# Patient Record
Sex: FEMALE
[De-identification: names, ages, dates, MRNs, and addresses within clinical notes are randomized per-mention and may not be internally consistent; named-entity substitution may affect disease eponyms.]

---

## 2022-11-12 ENCOUNTER — NURSE TRIAGE (OUTPATIENT)
Dept: OTHER | Facility: CLINIC | Age: 87
End: 2022-11-12

## 2022-11-12 NOTE — TELEPHONE ENCOUNTER
Location of patient: [place]    Subjective: Caller states \"fall\"     Current Symptoms: fall and c/o headache, jaw hurting and ribs has a lump back of head in middle quarter size, right side fall occurred 4 hours ago now c/o pain , no nausea no vision changes has glaucoma not confused is having some dizziness does not take blood thinners     Spoke with on call Dr Michelle Green regarding the patient and agrees with dispo to be seen in 4 hours     Associated Symptoms: NA    Pain Severity: 5/10    Temperature: none       What has been tried: ice     Recommended disposition: to be seen in 4 hours    Care advice provided, patient verbalizes understanding; denies any other questions or concerns.     Outcome:    Reason for Disposition   [1] Age over 59 years AND [2] swelling or bruise    Protocols used: Head Injury-ADULT-